# Patient Record
Sex: MALE | Race: BLACK OR AFRICAN AMERICAN
[De-identification: names, ages, dates, MRNs, and addresses within clinical notes are randomized per-mention and may not be internally consistent; named-entity substitution may affect disease eponyms.]

---

## 2020-04-24 ENCOUNTER — HOSPITAL ENCOUNTER (EMERGENCY)
Dept: HOSPITAL 15 - ER | Age: 60
Discharge: HOME | End: 2020-04-24
Payer: COMMERCIAL

## 2020-04-24 VITALS — HEIGHT: 68 IN | WEIGHT: 175 LBS | BODY MASS INDEX: 26.52 KG/M2

## 2020-04-24 VITALS — SYSTOLIC BLOOD PRESSURE: 124 MMHG | DIASTOLIC BLOOD PRESSURE: 75 MMHG

## 2020-04-24 DIAGNOSIS — M54.2: ICD-10-CM

## 2020-04-24 DIAGNOSIS — R59.1: Primary | ICD-10-CM

## 2020-04-24 LAB
ALBUMIN SERPL-MCNC: 3.7 G/DL (ref 3.4–5)
ALP SERPL-CCNC: 63 U/L (ref 45–117)
ALT SERPL-CCNC: 14 U/L (ref 16–61)
ANION GAP SERPL CALCULATED.3IONS-SCNC: 9 MMOL/L (ref 5–15)
BILIRUB SERPL-MCNC: 1.5 MG/DL (ref 0.2–1)
BUN SERPL-MCNC: 13 MG/DL (ref 7–18)
BUN/CREAT SERPL: 11.9
CALCIUM SERPL-MCNC: 8.7 MG/DL (ref 8.5–10.1)
CHLORIDE SERPL-SCNC: 102 MMOL/L (ref 98–107)
CO2 SERPL-SCNC: 24 MMOL/L (ref 21–32)
GLUCOSE SERPL-MCNC: 101 MG/DL (ref 74–106)
HCT VFR BLD AUTO: 43.1 % (ref 41–53)
HGB BLD-MCNC: 14.2 G/DL (ref 13.5–17.5)
MCH RBC QN AUTO: 31.4 PG (ref 28–32)
MCV RBC AUTO: 95.1 FL (ref 80–100)
NRBC BLD QL AUTO: 0 %
POTASSIUM SERPL-SCNC: 3.8 MMOL/L (ref 3.5–5.1)
PROT SERPL-MCNC: 7.8 G/DL (ref 6.4–8.2)
SODIUM SERPL-SCNC: 135 MMOL/L (ref 136–145)

## 2020-04-24 PROCEDURE — 70491 CT SOFT TISSUE NECK W/DYE: CPT

## 2020-04-24 PROCEDURE — 80053 COMPREHEN METABOLIC PANEL: CPT

## 2020-04-24 PROCEDURE — 85025 COMPLETE CBC W/AUTO DIFF WBC: CPT

## 2020-04-24 PROCEDURE — 36415 COLL VENOUS BLD VENIPUNCTURE: CPT

## 2020-04-24 PROCEDURE — 99285 EMERGENCY DEPT VISIT HI MDM: CPT

## 2020-04-24 PROCEDURE — 71260 CT THORAX DX C+: CPT

## 2025-01-15 ENCOUNTER — HOSPITAL ENCOUNTER (INPATIENT)
Dept: HOSPITAL 15 - SUR | Age: 65
LOS: 1 days | Discharge: HOME | DRG: 324 | End: 2025-01-16
Attending: ORTHOPAEDIC SURGERY | Admitting: ORTHOPAEDIC SURGERY
Payer: MEDICAID

## 2025-01-15 VITALS
OXYGEN SATURATION: 98 % | HEART RATE: 78 BPM | DIASTOLIC BLOOD PRESSURE: 87 MMHG | RESPIRATION RATE: 14 BRPM | SYSTOLIC BLOOD PRESSURE: 126 MMHG

## 2025-01-15 VITALS
RESPIRATION RATE: 17 BRPM | OXYGEN SATURATION: 98 % | HEART RATE: 70 BPM | DIASTOLIC BLOOD PRESSURE: 77 MMHG | SYSTOLIC BLOOD PRESSURE: 146 MMHG

## 2025-01-15 VITALS — RESPIRATION RATE: 17 BRPM | OXYGEN SATURATION: 99 % | HEART RATE: 67 BPM

## 2025-01-15 VITALS
DIASTOLIC BLOOD PRESSURE: 74 MMHG | HEART RATE: 68 BPM | RESPIRATION RATE: 12 BRPM | OXYGEN SATURATION: 99 % | SYSTOLIC BLOOD PRESSURE: 126 MMHG

## 2025-01-15 VITALS
DIASTOLIC BLOOD PRESSURE: 76 MMHG | HEART RATE: 67 BPM | SYSTOLIC BLOOD PRESSURE: 136 MMHG | OXYGEN SATURATION: 98 % | RESPIRATION RATE: 17 BRPM | TEMPERATURE: 98.4 F

## 2025-01-15 VITALS — BODY MASS INDEX: 31.74 KG/M2 | HEIGHT: 68 IN | WEIGHT: 209.44 LBS

## 2025-01-15 VITALS
SYSTOLIC BLOOD PRESSURE: 136 MMHG | HEART RATE: 67 BPM | DIASTOLIC BLOOD PRESSURE: 85 MMHG | TEMPERATURE: 98.7 F | RESPIRATION RATE: 17 BRPM | OXYGEN SATURATION: 99 %

## 2025-01-15 VITALS
OXYGEN SATURATION: 98 % | DIASTOLIC BLOOD PRESSURE: 67 MMHG | RESPIRATION RATE: 12 BRPM | SYSTOLIC BLOOD PRESSURE: 113 MMHG | HEART RATE: 68 BPM

## 2025-01-15 VITALS — RESPIRATION RATE: 13 BRPM | HEART RATE: 67 BPM | OXYGEN SATURATION: 99 %

## 2025-01-15 DIAGNOSIS — Z79.899: ICD-10-CM

## 2025-01-15 DIAGNOSIS — M16.12: Primary | ICD-10-CM

## 2025-01-15 PROCEDURE — 73501 X-RAY EXAM HIP UNI 1 VIEW: CPT

## 2025-01-15 PROCEDURE — 86901 BLOOD TYPING SEROLOGIC RH(D): CPT

## 2025-01-15 PROCEDURE — 97530 THERAPEUTIC ACTIVITIES: CPT

## 2025-01-15 PROCEDURE — 72170 X-RAY EXAM OF PELVIS: CPT

## 2025-01-15 PROCEDURE — 97116 GAIT TRAINING THERAPY: CPT

## 2025-01-15 PROCEDURE — 86850 RBC ANTIBODY SCREEN: CPT

## 2025-01-15 PROCEDURE — 86900 BLOOD TYPING SEROLOGIC ABO: CPT

## 2025-01-15 PROCEDURE — 80048 BASIC METABOLIC PNL TOTAL CA: CPT

## 2025-01-15 PROCEDURE — 0SRB04Z REPLACEMENT OF LEFT HIP JOINT WITH CERAMIC ON POLYETHYLENE SYNTHETIC SUBSTITUTE, OPEN APPROACH: ICD-10-PCS | Performed by: ORTHOPAEDIC SURGERY

## 2025-01-15 PROCEDURE — 97163 PT EVAL HIGH COMPLEX 45 MIN: CPT

## 2025-01-15 PROCEDURE — 85025 COMPLETE CBC W/AUTO DIFF WBC: CPT

## 2025-01-15 PROCEDURE — 36415 COLL VENOUS BLD VENIPUNCTURE: CPT

## 2025-01-15 PROCEDURE — 97110 THERAPEUTIC EXERCISES: CPT

## 2025-01-15 RX ADMIN — MORPHINE SULFATE ONE MG: 0.5 INJECTION EPIDURAL; INTRATHECAL; INTRAVENOUS at 11:37

## 2025-01-15 RX ADMIN — PREGABALIN SCH MG: 25 CAPSULE ORAL at 22:08

## 2025-01-15 RX ADMIN — CEFAZOLIN SODIUM SCH MLS/HR: 2 SOLUTION INTRAVENOUS at 17:49

## 2025-01-15 RX ADMIN — HYDROMORPHONE HYDROCHLORIDE PRN MG: 2 INJECTION, SOLUTION INTRAMUSCULAR; INTRAVENOUS; SUBCUTANEOUS at 13:07

## 2025-01-15 RX ADMIN — KETOROLAC TROMETHAMINE ONE MG: 30 INJECTION, SOLUTION INTRAMUSCULAR; INTRAVENOUS at 11:37

## 2025-01-15 RX ADMIN — ATORVASTATIN CALCIUM SCH MG: 20 TABLET, FILM COATED ORAL at 10:00

## 2025-01-15 RX ADMIN — TETRACAINE HCL ONE ML: 10 INJECTION SUBARACHNOID at 09:35

## 2025-01-15 RX ADMIN — CEFEPIME ONE: 1 INJECTION, POWDER, FOR SOLUTION INTRAMUSCULAR; INTRAVENOUS at 10:33

## 2025-01-15 RX ADMIN — METOCLOPRAMIDE ONE MG: 5 INJECTION, SOLUTION INTRAMUSCULAR; INTRAVENOUS at 13:00

## 2025-01-15 RX ADMIN — ROPIVACAINE HYDROCHLORIDE ONE MG: 5 INJECTION, SOLUTION EPIDURAL; INFILTRATION; PERINEURAL at 09:31

## 2025-01-15 RX ADMIN — HYDROMORPHONE HYDROCHLORIDE ONE MG: 2 INJECTION, SOLUTION INTRAMUSCULAR; INTRAVENOUS; SUBCUTANEOUS at 13:05

## 2025-01-15 RX ADMIN — ACETAMINOPHEN ONE MLS/HR: 10 INJECTION, SOLUTION INTRAVENOUS at 13:21

## 2025-01-15 RX ADMIN — BUPIVACAINE HYDROCHLORIDE ONE ML: 2.5 INJECTION, SOLUTION INFILTRATION; PERINEURAL at 11:37

## 2025-01-15 RX ADMIN — SODIUM CHLORIDE SCH MLS/HR: 0.9 INJECTION, SOLUTION INTRAVENOUS at 17:48

## 2025-01-15 RX ADMIN — KETOROLAC TROMETHAMINE ONE MG: 30 INJECTION, SOLUTION INTRAMUSCULAR; INTRAVENOUS at 11:01

## 2025-01-15 RX ADMIN — SODIUM CHLORIDE ONE MLS/HR: 9 INJECTION, SOLUTION INTRAVENOUS at 09:31

## 2025-01-15 RX ADMIN — KETOROLAC TROMETHAMINE ONE MG: 30 INJECTION, SOLUTION INTRAMUSCULAR; INTRAVENOUS at 14:40

## 2025-01-15 RX ADMIN — CEFAZOLIN ONE: 2 INJECTION, POWDER, FOR SOLUTION INTRAMUSCULAR; INTRAVENOUS at 10:14

## 2025-01-15 RX ADMIN — ACETAMINOPHEN ONE MG: 10 INJECTION, SOLUTION INTRAVENOUS at 13:25

## 2025-01-15 RX ADMIN — DEXTROSE ONE MG: 5 SOLUTION INTRAVENOUS at 09:32

## 2025-01-15 RX ADMIN — ACETAMINOPHEN SCH MG: 325 TABLET ORAL at 18:00

## 2025-01-15 RX ADMIN — KETOROLAC TROMETHAMINE SCH MG: 30 INJECTION, SOLUTION INTRAMUSCULAR; INTRAVENOUS at 18:00

## 2025-01-15 NOTE — [facility]
CLINICAL INDICATION: TOTAL HIP ARTHROPLASTY



TECHNIQUE: 1 radiographic views of the pelvis were obtained.



Comparison: None



FINDINGS/IMPRESSION:



Status post left hip arthroplasty.



Electronically Signed by: Meredith Colon at 01/15/2025 12:07:11 PM

## 2025-01-15 NOTE — [facility]
CLINICAL INDICATION: postop



TECHNIQUE: 1 radiographic views of the pelvis were obtained.



Comparison: None



FINDINGS/IMPRESSION:



Postsurgical changes from left hip arthroplasty.  Severe osteoarthrosis of the right femoroacetabular
 joint.



Electronically Signed by: Meredith Colon at 01/15/2025 13:05:57 PM

## 2025-01-15 NOTE — DVHOP2
Operative Report - 2


Report Details


Date:


1/15/25


Preop Diagnosis:


Left hip degenerative arthritis


Postop Diagnosis:  


Left hip degenerative arthritis


Surgeon:


Fransisco Cooney MD


Assistant:


Nathaniel LONG


Anesthesiologist:


Conner


Anesthesia:  General


Drains:


Smiley closed wound suction


Implant:


DonJoy origin stem size 13 standard offset, 0 neck length, 36 ceramic head, flat

polyethylene liner, 52 acetabular shell


Consent:


The patient was informed of the risks and benefits of the procedure. These 

include but are not limited to death complications of anesthesia, postoperative 

infection, incomplete relief of symptoms, recurrence of symptoms, damage to 

blood vessels, nerves and tendons, deep venous thrombosis, pulmonary embolism 

and possible need for repeat surgery in the future.


Complications:


None


Estimated Blood Loss:


200 cc


Fluids:


See anesthesia record


Findings:


Osteophytes at the femoral head and acetabulum, denuded cartilage with eburnated

bone, limited range of motion hip


Indications for Surgery:


Left hip degenerative arthritis with severe pain and functional impairment 

despite nonoperative management





Name of Procedure Performed


Left total hip arthroplasty





Procedure Details


Procedure Details:


The patient was brought to the operating room and given spinal anesthetic which 

did not take so he was given general anesthetic.  The patient was positioned 

lateral decubitus with the operative side up, stabilized with hip positioners.  

Axillary roll applied and lower extremities well-padded.  Preop patient received

IV Ancef, cefepime and IV tranexamic acid.  Surgical timeout was performed 

verifying patient, laterality and procedure.  The hip and lower extremity were 

prepped and draped in sterile fashion.  Incision was made over the greater 

trochanter.  Subcutaneous dissection and hemostasis were performed with Bovie 

and aqua mantis. I identified the fascia which was incised with Bovie and 

Charnley retractor inserted. I identified the gluteus medius that was split at 

the junction of its anterior and middle thirds with Bovie then incised off the 

anterior greater trochanter.   I incised the anterior gluteus minimus which was 

elevated off the capsule.  I elevated the reflected head of the rectus.  I then 

performed anterior capsulectomy with Bovie.  I extended capsular incision 

posterior medially and superior laterally.  The head was dislocated.  Femoral 

neck cut was made with saw and head removed.  Head diameter was calipered on the

back table.  I adjusted retractors to expose the acetabulum.  I 

circumferentially removed labral tissue with Bovie.  I removed foveal tissue 

with Bovie, curette and rongeur.  I then began reaming sequentially paying 

attention to inclination and version as I went.  I trialed which was stable so 

acetabular implant was brought into the field and tapped into the acetabulum 

with good fixation achieved.  I then brought up the flat liner which was spun to

make sure there was no soft tissue entrapment then tapped in and stability 

verified.  I then brought my attention to the proximal femur.  The leg was 

placed in the sterile bag anteriorly.  I cleaned up soft tissue at the greater 

trochanter shoulder with Bovie.  I then used a rongeur to clip the lateral neck.

 I then used a box osteotome, canal finder and lateralizing rasp.  I 

sequentially broached to size 13. I revised the femoral neck cut with calcar 

planer.  I trialed with a [0] neck length and [36] head which was stable.  

Intraoperative AP pelvis x-ray was obtained to verify length, offset and implant

 size.  The hip was dislocated. Neck and head trial removed. Broach was removed.

  I tapped in the femoral implant with good fixation achieved.  I cleaned and 

dried the Andersen taper and tapped on the  ceramic head.  The hip was again 

reduced and tested for stability which was good.  I irrigated with xperience. [I

 injected soft tissue with local anesthetic which consisted of 40 cc of 0.25% 

Marcaine with epinephrine, 30 mg Toradol, 10 mL with Duramorph consisting of 5 

mg.]  I placed a 2 grams of vancomycin in the deep and superficial wound.  I 

repaired the minimus and medius to the anterior greater trochanter with #[5] 

FiberWire in running fashion .  I oversewed the repair with 0 Vicryl.  I 

repaired the fascia with #1 Ethibond interrupted figure-of-eight.  Deep 

subcutaneous tissue was closed with 0 Vicryl.  Superficial subcutaneous tissue 

was closed with 2-0 Vicryl.  The skin was closed with staples.  I then applied 

the Smiley closed wound suction.  Patient tolerated the procedure well and was 

brought to the recovery room in stable condition.





Condition


Stable





Disposition








 Still a Patient

















FRANSISCO COONEY MD             Romario 15, 2025 12:18

## 2025-01-16 VITALS
SYSTOLIC BLOOD PRESSURE: 115 MMHG | OXYGEN SATURATION: 98 % | RESPIRATION RATE: 14 BRPM | DIASTOLIC BLOOD PRESSURE: 62 MMHG | HEART RATE: 71 BPM

## 2025-01-16 VITALS
RESPIRATION RATE: 18 BRPM | OXYGEN SATURATION: 95 % | HEART RATE: 83 BPM | DIASTOLIC BLOOD PRESSURE: 69 MMHG | SYSTOLIC BLOOD PRESSURE: 127 MMHG

## 2025-01-16 VITALS
HEART RATE: 80 BPM | SYSTOLIC BLOOD PRESSURE: 117 MMHG | DIASTOLIC BLOOD PRESSURE: 69 MMHG | TEMPERATURE: 99.5 F | OXYGEN SATURATION: 93 % | RESPIRATION RATE: 17 BRPM

## 2025-01-16 VITALS
OXYGEN SATURATION: 99 % | SYSTOLIC BLOOD PRESSURE: 114 MMHG | DIASTOLIC BLOOD PRESSURE: 65 MMHG | TEMPERATURE: 98.8 F | RESPIRATION RATE: 18 BRPM | HEART RATE: 68 BPM

## 2025-01-16 VITALS
DIASTOLIC BLOOD PRESSURE: 64 MMHG | HEART RATE: 72 BPM | RESPIRATION RATE: 16 BRPM | SYSTOLIC BLOOD PRESSURE: 114 MMHG | TEMPERATURE: 97.9 F | OXYGEN SATURATION: 99 %

## 2025-01-16 VITALS
OXYGEN SATURATION: 96 % | SYSTOLIC BLOOD PRESSURE: 128 MMHG | RESPIRATION RATE: 17 BRPM | TEMPERATURE: 98.6 F | HEART RATE: 76 BPM | DIASTOLIC BLOOD PRESSURE: 71 MMHG

## 2025-01-16 VITALS
OXYGEN SATURATION: 97 % | RESPIRATION RATE: 16 BRPM | DIASTOLIC BLOOD PRESSURE: 69 MMHG | HEART RATE: 81 BPM | SYSTOLIC BLOOD PRESSURE: 120 MMHG

## 2025-01-16 VITALS
SYSTOLIC BLOOD PRESSURE: 111 MMHG | DIASTOLIC BLOOD PRESSURE: 66 MMHG | RESPIRATION RATE: 16 BRPM | HEART RATE: 75 BPM | OXYGEN SATURATION: 98 %

## 2025-01-16 VITALS
OXYGEN SATURATION: 96 % | HEART RATE: 68 BPM | RESPIRATION RATE: 18 BRPM | DIASTOLIC BLOOD PRESSURE: 68 MMHG | SYSTOLIC BLOOD PRESSURE: 120 MMHG

## 2025-01-16 VITALS
HEART RATE: 72 BPM | RESPIRATION RATE: 18 BRPM | DIASTOLIC BLOOD PRESSURE: 73 MMHG | OXYGEN SATURATION: 95 % | SYSTOLIC BLOOD PRESSURE: 118 MMHG

## 2025-01-16 VITALS
DIASTOLIC BLOOD PRESSURE: 69 MMHG | HEART RATE: 98 BPM | OXYGEN SATURATION: 98 % | RESPIRATION RATE: 14 BRPM | SYSTOLIC BLOOD PRESSURE: 120 MMHG

## 2025-01-16 VITALS — DIASTOLIC BLOOD PRESSURE: 66 MMHG | OXYGEN SATURATION: 97 % | HEART RATE: 90 BPM | SYSTOLIC BLOOD PRESSURE: 116 MMHG

## 2025-01-16 VITALS
HEART RATE: 81 BPM | RESPIRATION RATE: 16 BRPM | DIASTOLIC BLOOD PRESSURE: 69 MMHG | SYSTOLIC BLOOD PRESSURE: 120 MMHG | OXYGEN SATURATION: 97 %

## 2025-01-16 VITALS
DIASTOLIC BLOOD PRESSURE: 71 MMHG | RESPIRATION RATE: 16 BRPM | OXYGEN SATURATION: 96 % | HEART RATE: 69 BPM | SYSTOLIC BLOOD PRESSURE: 127 MMHG

## 2025-01-16 VITALS
DIASTOLIC BLOOD PRESSURE: 67 MMHG | OXYGEN SATURATION: 93 % | SYSTOLIC BLOOD PRESSURE: 122 MMHG | HEART RATE: 89 BPM | RESPIRATION RATE: 16 BRPM

## 2025-01-16 VITALS
OXYGEN SATURATION: 98 % | RESPIRATION RATE: 17 BRPM | TEMPERATURE: 98.7 F | DIASTOLIC BLOOD PRESSURE: 73 MMHG | HEART RATE: 72 BPM | SYSTOLIC BLOOD PRESSURE: 127 MMHG

## 2025-01-16 VITALS — RESPIRATION RATE: 18 BRPM | HEART RATE: 77 BPM | OXYGEN SATURATION: 99 %

## 2025-01-16 VITALS
DIASTOLIC BLOOD PRESSURE: 63 MMHG | SYSTOLIC BLOOD PRESSURE: 117 MMHG | HEART RATE: 80 BPM | RESPIRATION RATE: 20 BRPM | OXYGEN SATURATION: 95 %

## 2025-01-16 VITALS
HEART RATE: 88 BPM | RESPIRATION RATE: 16 BRPM | OXYGEN SATURATION: 97 % | SYSTOLIC BLOOD PRESSURE: 115 MMHG | DIASTOLIC BLOOD PRESSURE: 67 MMHG

## 2025-01-16 VITALS
HEART RATE: 75 BPM | RESPIRATION RATE: 18 BRPM | DIASTOLIC BLOOD PRESSURE: 71 MMHG | OXYGEN SATURATION: 95 % | SYSTOLIC BLOOD PRESSURE: 118 MMHG

## 2025-01-16 LAB
ANION GAP SERPL CALCULATED.3IONS-SCNC: 7 MMOL/L (ref 5–15)
BUN SERPL-MCNC: 16 MG/DL (ref 9–23)
BUN/CREAT SERPL: 17 (ref 10–20)
CALCIUM SERPL-MCNC: 8.8 MG/DL (ref 8.7–10.4)
CHLORIDE SERPL-SCNC: 107 MMOL/L (ref 98–107)
CO2 SERPL-SCNC: 24 MMOL/L (ref 20–31)
GLUCOSE SERPL-MCNC: 151 MG/DL (ref 74–106)
HCT VFR BLD AUTO: 29.1 % (ref 41–53)
HGB BLD-MCNC: 9.9 G/DL (ref 13.5–17.5)
MCH RBC QN AUTO: 33.9 PG (ref 28–32)
MCV RBC AUTO: 99.9 FL (ref 80–100)
NRBC BLD QL AUTO: 0 %
POTASSIUM SERPL-SCNC: 3.9 MMOL/L (ref 3.5–5.1)
SODIUM SERPL-SCNC: 138 MMOL/L (ref 136–145)

## 2025-01-16 RX ADMIN — APIXABAN SCH MG: 2.5 TABLET, FILM COATED ORAL at 09:55

## 2025-01-16 NOTE — DVHDS2
Discharge Summary


Date of Admission


Romario 15, 2025 at 11:28





Date of Discharge:


Jan 16, 2025





Labs/Diagnostic Data:





                               Laboratory Results








Test


 1/16/25


06:02


 


White Blood Count


 7.0 10^3/uL


(4.4-10.8)


 


Red Blood Count


 2.91 10^6/uL


(4.5-5.90)


 


Hemoglobin


 9.9 g/dL


(13.5-17.5)


 


Hematocrit


 29.1 %


(41.0-53.0)


 


Mean Corpuscular Volume


 99.9 fL


(80.0-100.0)


 


Mean Corpuscular Hemoglobin


 33.9 pg


(28.0-32.0)


 


Mean Corpuscular Hemoglobin


Concent 34.0 g/dL


(32.0-36.0)


 


Red Cell Distribution Width


 13.6 %


(11.8-14.3)


 


Platelet Count


 122 10^3/uL


(140-450)


 


Mean Platelet Volume


 9.4 fL


(6.9-10.8)


 


Neutrophils (%) (Auto)


 64.7 %


(37.0-80.0)


 


Lymphocytes (%) (Auto)


 26.3 %


(10.0-50.0)


 


Monocytes (%) (Auto)


 8.8 %


(0.0-12.0)


 


Eosinophils (%) (Auto)


 0.1 %


(0.0-7.0)


 


Basophils (%) (Auto)


 0.1 %


(0.0-2.0)


 


Neutrophils # (Auto)


 4.6 10 ^3/uL


(1.6-8.6)


 


Lymphocytes # (Auto)


 1.8 10 ^3/uL


(0.4-5.4)


 


Monocytes # (Auto)


 0.6 10 ^3/uL


(0-1.3)


 


Eosinophils # (Auto)


 0 10 ^3/uL


(0-0.8)


 


Basophils # (Auto)


 0 10 ^3/uL


(0-0.2)


 


Nucleated Red Blood Cells 0.0 % 


 


Sodium Level


 138 mmol/L


(136-145)


 


Potassium Level


 3.9 mmol/L


(3.5-5.1)


 


Chloride Level


 107 mmol/L


()


 


Carbon Dioxide Level


 24 mmol/L


(20-31)


 


Anion Gap 7 (5-15) 


 


Blood Urea Nitrogen


 16 mg/dL


(9-23)


 


Creatinine


 0.94 mg/dL


(0.700-1.30)


 


Glomerular Filtration Rate


Calc 91 mL/min


(>90)


 


BUN/Creatinine Ratio


 17.0


(10.0-20.0)


 


Serum Glucose


 151 mg/dL


()


 


Calcium Level


 8.8 mg/dL


(8.7-10.4)





                             Other Laboratory Tests


1/16/25 06:02











Brief Hx & Hospital Course:


The patient was brought to the hospital yesterday to undergo a left total hip 

arthroplasty, he tolerated the procedure well without complications and was kept

overnight for postoperative observation.  He has remained medically stable 

throughout his stay and denied any overnight events and reports some 

postoperative hip pain that is being well managed with the help of pain 

medication.  Patient reports that he was also able to get up and walk with the 

help of physical therapy and his walker and was able to get around the nurses 

station and back to his bed with some postoperative hip pain.  Patient is 

otherwise feeling well denying any other complaint or concern during my 

evaluation and would like to go home.





Condition at Discharge:


Stable





Final Diagnosis/Problems List





Left hip degenerative arthritis





Discharge Disposition:


Home





Discharge Instruct/Medications


Diet:  Regular


Activity:  See Comment


Activity comment:  


Patient to remain weight-bearing as tolerated with the assistance of a 


walker


Follow Up/Referral:  


I instructed the patient to follow up with our office in 10-14 days for 


his 1st postoperative evaluation


Medications:  


Rx sent via our outpatient EMR system


Discharge Statement:


"Patient was advised to return to the ER or call 911 if any headaches, 

dizziness, shortness of breath, chest pain, abdominal pain, bleeding, fevers, or

worsening of medical condition.





Patient was counseled about treatment plan, medications, possible side effects, 

patientverbalized understanding. All questions were answered to the best of my 

ability.





This discharge took greater then 30 minutes in planning, reviewing 

documentation, counseling the patient, and discussing with other team members."





ASSESSMENT


Left hip degenerative arthritis











JASIEL CAPUTO           Jan 16, 2025 12:51

## 2025-01-16 NOTE — DVHPN2
Progress Note - Dictate


Date Seen:  Jan 16, 2025


Medical Necessity Reason


Pt with a Central, PICC or Fol:  No


Subjective


Patient was lying comfortably in bed during my evaluation and reports some 

postoperative hip pain that is being well managed with the help of pain 

medication.  Patient reports that he was also able to get up and walk with the 

help of physical therapy and his walker and was able to get down the aguirre to the

nurse's station and back to his bed with some pain.  Patient is otherwise 

feeling well denying any other complaint or concern during my evaluation and 

would like to go home.


vital signs





                                   Vital Sign








  Date Time  Temp Pulse Resp B/P (MAP) Pulse Ox O2 Delivery O2 Flow Rate FiO2


 


1/16/25 10:52  72 18  95   


 


1/16/25 09:00 98.8   114/65 (81)    





 98.8       


 


1/15/25 20:00      Nasal Cannula* 2 28














                           Total Intake and Output   


 


 1/15/25 1/15/25 1/16/25





 15:00 23:00 07:00


 


Intake Total 250 ml 50 ml 350 ml


 


Output Total   725 ml


 


Balance 250 ml 50 ml -375 ml








medications





                               Current Medications








 Medications  Dose


 Ordered  Sig/Nick


 Route  Start Time


 Stop Time Status Last Admin


Dose Admin


 


 Atorvastatin


 Calcium  20 mg  DAILY


 PO  1/15/25 10:00


    1/16/25 09:55


20 MG


 


 Pregabalin  50 mg  BID


 PO  1/15/25 22:00


    1/16/25 09:55


50 MG


 


 Apixaban  2.5 mg  BID


 PO  1/16/25 10:00


 2/20/25 09:59  1/16/25 09:55


2.5 MG


 


 Sodium Chloride  1,000 ml @ 


 125 mls/hr  Q8H


 IV  1/15/25 11:30


    1/16/25 05:09


125 MLS/HR


 


 Acetaminophen  650 mg  Q6HP


 PO  1/15/25 18:00


    1/16/25 06:52


650 MG


 


 Ketorolac


 Tromethamine  15 mg  Q6HR


 IV  1/15/25 18:00


 1/20/25 17:59  1/16/25 06:52


15 MG


 


 Ondansetron HCl  4 mg  Q4HP  PRN


 IV  1/15/25 12:30


     





 


 Oxycodone HCl  5 mg  Q4HP  PRN


 PO  1/15/25 11:30


     





 


 Oxycodone HCl  10 mg  Q4HP  PRN


 PO  1/15/25 11:30


     





 


 Diphenhydramine


 HCl  25 mg  Q4HP  PRN


 IV  1/15/25 13:00


     











objective


A&O x4 in no acute distress


Hip range of motion grossly limited with pain on movement


Smiley dressing clean, dry, intact, and maintaining suction


No distal edema or calf tenderness to palpation


Neurovascularly intact with cap refill less than 2 seconds


laboratory and microbiology


                                Laboratory Tests


1/16/25 06:02

















Test


 1/16/25


06:02 Range/Units


 


 


Serum Glucose 151 H   mg/dL








Assessment/Plan


Patient to be discharged home and advised to remain weight-bearing as tolerated 

with the assistance of a walker.  I also instructed the patient to maintain his 

dressings clean, dry, intact, and maintaining suction and to call our office if 

he has any questions or concerns.  Patient was instructed to follow up with our 

office in 10-14 days for his 1st postoperative evaluation.  Rx sent via our 

outpatient EMR system.  He understood and agreed.


Plan discussed with:  Patient











JASIEL CAPUTO           Jan 16, 2025 12:53